# Patient Record
Sex: FEMALE | Race: OTHER | Employment: OTHER | URBAN - METROPOLITAN AREA
[De-identification: names, ages, dates, MRNs, and addresses within clinical notes are randomized per-mention and may not be internally consistent; named-entity substitution may affect disease eponyms.]

---

## 2022-06-20 PROCEDURE — 99285 EMERGENCY DEPT VISIT HI MDM: CPT

## 2022-06-21 ENCOUNTER — HOSPITAL ENCOUNTER (EMERGENCY)
Age: 87
Discharge: HOME | End: 2022-06-21
Attending: STUDENT IN AN ORGANIZED HEALTH CARE EDUCATION/TRAINING PROGRAM | Admitting: STUDENT IN AN ORGANIZED HEALTH CARE EDUCATION/TRAINING PROGRAM

## 2022-06-21 ENCOUNTER — APPOINTMENT (OUTPATIENT)
Dept: GENERAL RADIOLOGY | Age: 87
End: 2022-06-21

## 2022-06-21 VITALS
SYSTOLIC BLOOD PRESSURE: 158 MMHG | OXYGEN SATURATION: 93 % | DIASTOLIC BLOOD PRESSURE: 78 MMHG | RESPIRATION RATE: 17 BRPM | HEART RATE: 85 BPM | HEIGHT: 62 IN | WEIGHT: 165 LBS | TEMPERATURE: 98.7 F | BODY MASS INDEX: 30.36 KG/M2

## 2022-06-21 DIAGNOSIS — J06.9 URI WITH COUGH AND CONGESTION: Primary | ICD-10-CM

## 2022-06-21 LAB
ALBUMIN SERPL-MCNC: 2.5 G/DL (ref 3.2–4.6)
ALBUMIN/GLOB SERPL: 0.7 {RATIO} (ref 1.2–3.5)
ALP SERPL-CCNC: 95 U/L (ref 50–136)
ALT SERPL-CCNC: 26 U/L (ref 12–65)
ANION GAP SERPL CALC-SCNC: 7 MMOL/L (ref 7–16)
AST SERPL-CCNC: 34 U/L (ref 15–37)
BILIRUB SERPL-MCNC: 0.6 MG/DL (ref 0.2–1.1)
BUN SERPL-MCNC: 29 MG/DL (ref 8–23)
CALCIUM SERPL-MCNC: 8.4 MG/DL (ref 8.3–10.4)
CHLORIDE SERPL-SCNC: 114 MMOL/L (ref 98–107)
CO2 SERPL-SCNC: 22 MMOL/L (ref 21–32)
CREAT SERPL-MCNC: 2 MG/DL (ref 0.6–1)
EKG ATRIAL RATE: 77 BPM
EKG DIAGNOSIS: NORMAL
EKG P AXIS: 66 DEGREES
EKG P-R INTERVAL: 147 MS
EKG Q-T INTERVAL: 391 MS
EKG QRS DURATION: 91 MS
EKG QTC CALCULATION (BAZETT): 443 MS
EKG R AXIS: -2 DEGREES
EKG T AXIS: 72 DEGREES
EKG VENTRICULAR RATE: 77 BPM
ERYTHROCYTE [DISTWIDTH] IN BLOOD BY AUTOMATED COUNT: 13.2 % (ref 11.9–14.6)
GLOBULIN SER CALC-MCNC: 3.5 G/DL (ref 2.3–3.5)
GLUCOSE SERPL-MCNC: 110 MG/DL (ref 65–100)
HCT VFR BLD AUTO: 34.7 % (ref 35.8–46.3)
HGB BLD-MCNC: 10.8 G/DL (ref 11.7–15.4)
LIPASE SERPL-CCNC: 142 U/L (ref 73–393)
MAGNESIUM SERPL-MCNC: 3 MG/DL (ref 1.8–2.4)
MCH RBC QN AUTO: 29.8 PG (ref 26.1–32.9)
MCHC RBC AUTO-ENTMCNC: 31.1 G/DL (ref 31.4–35)
MCV RBC AUTO: 95.9 FL (ref 79.6–97.8)
NRBC # BLD: 0 K/UL (ref 0–0.2)
NT PRO BNP: 1402 PG/ML
PLATELET # BLD AUTO: 235 K/UL (ref 150–450)
PMV BLD AUTO: 10.1 FL (ref 9.4–12.3)
POTASSIUM SERPL-SCNC: 4.5 MMOL/L (ref 3.5–5.1)
PROT SERPL-MCNC: 6 G/DL (ref 6.3–8.2)
RBC # BLD AUTO: 3.62 M/UL (ref 4.05–5.2)
SARS-COV-2 RDRP RESP QL NAA+PROBE: NOT DETECTED
SODIUM SERPL-SCNC: 143 MMOL/L (ref 136–145)
SOURCE: NORMAL
TROPONIN I SERPL HS-MCNC: 10.9 PG/ML (ref 0–14)
WBC # BLD AUTO: 11.8 K/UL (ref 4.3–11.1)

## 2022-06-21 PROCEDURE — 87635 SARS-COV-2 COVID-19 AMP PRB: CPT

## 2022-06-21 PROCEDURE — 94664 DEMO&/EVAL PT USE INHALER: CPT

## 2022-06-21 PROCEDURE — 93005 ELECTROCARDIOGRAM TRACING: CPT

## 2022-06-21 PROCEDURE — 80053 COMPREHEN METABOLIC PANEL: CPT

## 2022-06-21 PROCEDURE — 6370000000 HC RX 637 (ALT 250 FOR IP): Performed by: STUDENT IN AN ORGANIZED HEALTH CARE EDUCATION/TRAINING PROGRAM

## 2022-06-21 PROCEDURE — 83880 ASSAY OF NATRIURETIC PEPTIDE: CPT

## 2022-06-21 PROCEDURE — 71046 X-RAY EXAM CHEST 2 VIEWS: CPT

## 2022-06-21 PROCEDURE — 83690 ASSAY OF LIPASE: CPT

## 2022-06-21 PROCEDURE — 2580000003 HC RX 258: Performed by: STUDENT IN AN ORGANIZED HEALTH CARE EDUCATION/TRAINING PROGRAM

## 2022-06-21 PROCEDURE — 85027 COMPLETE CBC AUTOMATED: CPT

## 2022-06-21 PROCEDURE — 84484 ASSAY OF TROPONIN QUANT: CPT

## 2022-06-21 PROCEDURE — 83735 ASSAY OF MAGNESIUM: CPT

## 2022-06-21 PROCEDURE — 2700000000 HC OXYGEN THERAPY PER DAY

## 2022-06-21 PROCEDURE — 71045 X-RAY EXAM CHEST 1 VIEW: CPT

## 2022-06-21 PROCEDURE — 94640 AIRWAY INHALATION TREATMENT: CPT

## 2022-06-21 RX ORDER — SODIUM CHLORIDE 0.9 % (FLUSH) 0.9 %
3 SYRINGE (ML) INJECTION EVERY 8 HOURS
Status: DISCONTINUED | OUTPATIENT
Start: 2022-06-21 | End: 2022-06-21 | Stop reason: HOSPADM

## 2022-06-21 RX ORDER — IPRATROPIUM BROMIDE AND ALBUTEROL SULFATE 2.5; .5 MG/3ML; MG/3ML
1 SOLUTION RESPIRATORY (INHALATION)
Status: COMPLETED | OUTPATIENT
Start: 2022-06-21 | End: 2022-06-21

## 2022-06-21 RX ORDER — AZITHROMYCIN 250 MG/1
250 TABLET, FILM COATED ORAL SEE ADMIN INSTRUCTIONS
Qty: 6 TABLET | Refills: 0 | Status: SHIPPED | OUTPATIENT
Start: 2022-06-21 | End: 2022-06-26

## 2022-06-21 RX ORDER — 0.9 % SODIUM CHLORIDE 0.9 %
250 INTRAVENOUS SOLUTION INTRAVENOUS
Status: COMPLETED | OUTPATIENT
Start: 2022-06-21 | End: 2022-06-21

## 2022-06-21 RX ORDER — 0.9 % SODIUM CHLORIDE 0.9 %
500 INTRAVENOUS SOLUTION INTRAVENOUS
Status: DISCONTINUED | OUTPATIENT
Start: 2022-06-21 | End: 2022-06-21

## 2022-06-21 RX ADMIN — IPRATROPIUM BROMIDE AND ALBUTEROL SULFATE 1 AMPULE: .5; 3 SOLUTION RESPIRATORY (INHALATION) at 01:14

## 2022-06-21 RX ADMIN — SODIUM CHLORIDE 250 ML: 9 INJECTION, SOLUTION INTRAVENOUS at 02:06

## 2022-06-21 ASSESSMENT — PAIN - FUNCTIONAL ASSESSMENT: PAIN_FUNCTIONAL_ASSESSMENT: 0-10

## 2022-06-21 ASSESSMENT — PAIN SCALES - GENERAL
PAINLEVEL_OUTOF10: 3
PAINLEVEL_OUTOF10: 9

## 2022-06-21 ASSESSMENT — PAIN DESCRIPTION - LOCATION
LOCATION: RIB CAGE
LOCATION: CHEST

## 2022-06-21 ASSESSMENT — ENCOUNTER SYMPTOMS
SORE THROAT: 0
DIARRHEA: 0
NAUSEA: 0
SHORTNESS OF BREATH: 1
COUGH: 1
VOMITING: 0
PHOTOPHOBIA: 0

## 2022-06-21 ASSESSMENT — PULMONARY FUNCTION TESTS: PEFR_L/MIN: 28

## 2022-06-21 NOTE — ED PROVIDER NOTES
[facility] Provider Note                   PCP:                None Provider               Age: 82 y.o. Sex: female       ICD-10-CM    1. URI with cough and congestion  J06.9 azithromycin (ZITHROMAX) 250 MG tablet       DISPOSITION Decision To Discharge 06/21/2022 02:43:01 AM       New Prescriptions    AZITHROMYCIN (ZITHROMAX) 250 MG TABLET    Take 1 tablet by mouth See Admin Instructions for 5 days 500mg on day 1 followed by 250mg on days 2 - 5       Orders Placed This Encounter   Procedures    COVID-19, Rapid    XR CHEST PORTABLE    XR CHEST (2 VW)    Troponin    CBC    Comprehensive Metabolic Panel    Lipase    Magnesium    proBNP, N-TERMINAL    Cardiac Monitor    Pulse Oximetry    EKG 12 Lead    Saline lock IV        Rafael Theodore, DO 2:54 AM      MDM  Number of Diagnoses or Management Options  URI with cough and congestion  Diagnosis management comments: 47-year-old female presents the ER with cough and shortness of breath. Increase respiratory rate with increased work of breathing, slightly anxious on exam, will obtain a broad-based work-up. Patient given breathing treatment which seems to improve her work of breathing. Lab work shows normal white count, stable H&H, normal electrolytes, BUN 29, creatinine is 2, GFR 25, normal LFTs and lipase, magnesium is 3.0, patient does appear clinically dry, will give 250 cc of IV fluid, troponin normal.  Chest x-ray questions right lower lobe possible infiltrate as well as right peritracheal region. Radiologist felt this could be early developing infiltrate. BNP 1400, normal troponin. Patient with significantly improved symptoms after breathing treatment here in the ER. Given patient's congestion with continued cough, will place on azithromycin. Advised to follow-up with free clinic. Patient is from [facility] has been telemetry for approximately 1 month with plans to be of another 2 months.   For these reasons, I advised family to have patient be seen by a local family physician. Unclear patient's baseline lab values are. Patient and family voiced understanding and agreement with this plan. Amount and/or Complexity of Data Reviewed  Clinical lab tests: reviewed and ordered  Tests in the radiology section of CPT®: ordered and reviewed  Obtain history from someone other than the patient: yes  Independent visualization of images, tracings, or specimens: yes    Risk of Complications, Morbidity, and/or Mortality  Presenting problems: moderate  Diagnostic procedures: moderate  Management options: moderate         Ivonne Mathews is a 82 y.o. female who presents to the Emergency Department with chief complaint of    Chief Complaint   Patient presents with    Shortness of Breath    Cough      Patient is Croatian-speaking, history obtained via iPad   Patient is 62-year-old female presents emergency department with family at bedside. Family is primary historian. Family reports patient is complained of shortness of breath, dry cough as well as chest pressure that began 4 days ago. Worse today. Patient describes her pain as a pressure, mild to moderate in nature, does not radiate, no other associated symptoms. No worsening or improving factors. Denies abdominal pain. Has a history of hypertension only. Review of Systems   Constitutional: Negative for chills and fever. HENT: Negative for sore throat. Eyes: Negative for photophobia. Respiratory: Positive for cough and shortness of breath. Cardiovascular: Positive for chest pain. Gastrointestinal: Negative for diarrhea, nausea and vomiting. Genitourinary: Negative for dysuria. Musculoskeletal: Negative for neck pain and neck stiffness. Skin: Negative for rash. Neurological: Negative for syncope and headaches. Psychiatric/Behavioral: Negative for confusion. All other systems reviewed and are negative.       No past medical history on file.     No past surgical history on file. No family history on file. Social Connections:     Frequency of Communication with Friends and Family: Not on file    Frequency of Social Gatherings with Friends and Family: Not on file    Attends Baptism Services: Not on file    Active Member of Clubs or Organizations: Not on file    Attends Club or Organization Meetings: Not on file    Marital Status: Not on file        No Known Allergies     Vitals signs and nursing note reviewed. Patient Vitals for the past 4 hrs:   Temp Pulse Resp BP SpO2   06/21/22 0219 -- 85 19 -- 98 %   06/21/22 0216 -- 85 20 -- 96 %   06/21/22 0215 -- 93 23 (!) 151/81 98 %   06/21/22 0144 -- 85 18 -- 96 %   06/21/22 0134 -- 82 21 -- 96 %   06/21/22 0130 -- 78 21 (!) 178/74 --   06/21/22 0115 -- 74 27 -- 97 %   06/21/22 0111 -- 82 (!) 31 -- 94 %   06/21/22 0044 -- 77 17 -- 92 %   06/21/22 0039 -- 83 24 -- 94 %   06/21/22 0034 -- 74 23 (!) 195/93 94 %   06/21/22 0019 -- 80 22 (!) 194/93 94 %   06/21/22 0009 100 °F (37.8 °C) 84 20 (!) 178/95 96 %          Physical Exam  Vitals and nursing note reviewed. Constitutional:       General: She is not in acute distress. Appearance: Normal appearance. HENT:      Head: Normocephalic. Nose: Nose normal.      Mouth/Throat:      Mouth: Mucous membranes are moist.   Eyes:      Extraocular Movements: Extraocular movements intact. Cardiovascular:      Rate and Rhythm: Normal rate and regular rhythm. Pulses: Normal pulses. Heart sounds: Normal heart sounds. Pulmonary:      Effort: Pulmonary effort is normal. Tachypnea present. Breath sounds: Normal breath sounds. Abdominal:      General: Abdomen is flat. Palpations: Abdomen is soft. Tenderness: There is no abdominal tenderness. Musculoskeletal:         General: No swelling or tenderness. Normal range of motion. Cervical back: Normal range of motion. No rigidity.    Skin:     General: Skin is warm.      Findings: No rash. Neurological:      General: No focal deficit present. Mental Status: She is alert and oriented to person, place, and time. Psychiatric:         Mood and Affect: Mood is anxious. Procedures      Labs Reviewed   CBC - Abnormal; Notable for the following components:       Result Value    WBC 11.8 (*)     RBC 3.62 (*)     Hemoglobin 10.8 (*)     Hematocrit 34.7 (*)     MCHC 31.1 (*)     All other components within normal limits   COMPREHENSIVE METABOLIC PANEL - Abnormal; Notable for the following components:    Chloride 114 (*)     Glucose 110 (*)     BUN 29 (*)     CREATININE 2.00 (*)     GFR  31 (*)     GFR Non- 25 (*)     Total Protein 6.0 (*)     Albumin 2.5 (*)     Albumin/Globulin Ratio 0.7 (*)     All other components within normal limits   MAGNESIUM - Abnormal; Notable for the following components:    Magnesium 3.0 (*)     All other components within normal limits   PROBNP, N-TERMINAL - Abnormal; Notable for the following components:    NT Pro-BNP 1,402 (*)     All other components within normal limits   COVID-19, RAPID   TROPONIN   LIPASE   TROPONIN        XR CHEST PORTABLE   Final Result   Subtle increased opacity in the right lower lobe may represent developing   infiltrate. Subtle increased opacity in the right paratracheal region could also be due to   infiltrate or the superior vena cava overlying osseous structures. Follow-up chest x-ray with standard PA and lateral views is recommended when the   patient is able. Mild elevation of the left hemidiaphragm. XR CHEST (2 VW)    (Results Pending)                          Voice dictation software was used during the making of this note. This software is not perfect and grammatical and other typographical errors may be present. This note has not been completely proofread for errors.         Kim Woody DO  06/21/22 [id number]

## 2022-06-21 NOTE — ED TRIAGE NOTES
Having a dry cough for the last 4 days, today started to get worse sob   Family states she is having chest pain yesterday she a little bit and today it is worse   Family states she has hypertension, family and pt are poor historians

## 2022-06-21 NOTE — ED NOTES
I have reviewed discharge instructions with the patient and caregiver. The patient and caregiver verbalized understanding. Patient left ED via Discharge Method: wheelchair to Home with (son and daughter). Opportunity for questions and clarification provided and video  used for d/c teaching      Patient given 1 scripts. To continue your aftercare when you leave the hospital, you may receive an automated call from our care team to check in on how you are doing. This is a free service and part of our promise to provide the best care and service to meet your aftercare needs.  If you have questions, or wish to unsubscribe from this service please call [phone]. Thank you for Choosing our [facility] Emergency Department.       Lucy Liu RN  06/21/22 0053

## 2022-06-22 ENCOUNTER — HOSPITAL ENCOUNTER (EMERGENCY)
Age: 87
Discharge: HOME | End: 2022-06-22
Attending: EMERGENCY MEDICINE

## 2022-06-22 ENCOUNTER — APPOINTMENT (OUTPATIENT)
Dept: GENERAL RADIOLOGY | Age: 87
End: 2022-06-22

## 2022-06-22 VITALS
SYSTOLIC BLOOD PRESSURE: 159 MMHG | BODY MASS INDEX: 30.36 KG/M2 | HEART RATE: 77 BPM | RESPIRATION RATE: 18 BRPM | WEIGHT: 165 LBS | DIASTOLIC BLOOD PRESSURE: 80 MMHG | TEMPERATURE: 98.5 F | HEIGHT: 62 IN | OXYGEN SATURATION: 93 %

## 2022-06-22 DIAGNOSIS — J18.9 PNEUMONIA OF RIGHT LOWER LOBE DUE TO INFECTIOUS ORGANISM: ICD-10-CM

## 2022-06-22 DIAGNOSIS — R06.2 WHEEZING: Primary | ICD-10-CM

## 2022-06-22 LAB
ANION GAP SERPL CALC-SCNC: 7 MMOL/L (ref 7–16)
BASOPHILS # BLD: 0.1 K/UL (ref 0–0.2)
BASOPHILS NFR BLD: 1 % (ref 0–2)
BUN SERPL-MCNC: 28 MG/DL (ref 8–23)
CALCIUM SERPL-MCNC: 8 MG/DL (ref 8.3–10.4)
CHLORIDE SERPL-SCNC: 111 MMOL/L (ref 98–107)
CO2 SERPL-SCNC: 23 MMOL/L (ref 21–32)
CREAT SERPL-MCNC: 2.1 MG/DL (ref 0.6–1)
DIFFERENTIAL METHOD BLD: ABNORMAL
EOSINOPHIL # BLD: 0.1 K/UL (ref 0–0.8)
EOSINOPHIL NFR BLD: 1 % (ref 0.5–7.8)
ERYTHROCYTE [DISTWIDTH] IN BLOOD BY AUTOMATED COUNT: 13 % (ref 11.9–14.6)
GLUCOSE SERPL-MCNC: 121 MG/DL (ref 65–100)
HCT VFR BLD AUTO: 34.4 % (ref 35.8–46.3)
HGB BLD-MCNC: 10.7 G/DL (ref 11.7–15.4)
IMM GRANULOCYTES # BLD AUTO: 0 K/UL (ref 0–0.5)
IMM GRANULOCYTES NFR BLD AUTO: 0 % (ref 0–5)
LYMPHOCYTES # BLD: 4.4 K/UL (ref 0.5–4.6)
LYMPHOCYTES NFR BLD: 33 % (ref 13–44)
MCH RBC QN AUTO: 30.1 PG (ref 26.1–32.9)
MCHC RBC AUTO-ENTMCNC: 31.1 G/DL (ref 31.4–35)
MCV RBC AUTO: 96.9 FL (ref 79.6–97.8)
MONOCYTES # BLD: 1.1 K/UL (ref 0.1–1.3)
MONOCYTES NFR BLD: 8 % (ref 4–12)
NEUTS SEG # BLD: 7.6 K/UL (ref 1.7–8.2)
NEUTS SEG NFR BLD: 57 % (ref 43–78)
NRBC # BLD: 0 K/UL (ref 0–0.2)
PLATELET # BLD AUTO: 232 K/UL (ref 150–450)
PLATELET COMMENT: ADEQUATE
PMV BLD AUTO: 10.5 FL (ref 9.4–12.3)
POTASSIUM SERPL-SCNC: 4.3 MMOL/L (ref 3.5–5.1)
RBC # BLD AUTO: 3.55 M/UL (ref 4.05–5.2)
RBC MORPH BLD: ABNORMAL
SODIUM SERPL-SCNC: 141 MMOL/L (ref 136–145)
WBC # BLD AUTO: 13.3 K/UL (ref 4.3–11.1)
WBC MORPH BLD: ABNORMAL

## 2022-06-22 PROCEDURE — 6370000000 HC RX 637 (ALT 250 FOR IP): Performed by: PHYSICIAN ASSISTANT

## 2022-06-22 PROCEDURE — 94640 AIRWAY INHALATION TREATMENT: CPT

## 2022-06-22 PROCEDURE — 85025 COMPLETE CBC W/AUTO DIFF WBC: CPT

## 2022-06-22 PROCEDURE — 71045 X-RAY EXAM CHEST 1 VIEW: CPT

## 2022-06-22 PROCEDURE — 2700000000 HC OXYGEN THERAPY PER DAY

## 2022-06-22 PROCEDURE — 99284 EMERGENCY DEPT VISIT MOD MDM: CPT

## 2022-06-22 PROCEDURE — 80048 BASIC METABOLIC PNL TOTAL CA: CPT

## 2022-06-22 RX ORDER — IPRATROPIUM BROMIDE AND ALBUTEROL SULFATE 2.5; .5 MG/3ML; MG/3ML
1 SOLUTION RESPIRATORY (INHALATION) EVERY 6 HOURS PRN
Qty: 360 ML | Refills: 0 | Status: SHIPPED | OUTPATIENT
Start: 2022-06-22 | End: 2022-07-22

## 2022-06-22 RX ORDER — IPRATROPIUM BROMIDE AND ALBUTEROL SULFATE 2.5; .5 MG/3ML; MG/3ML
1 SOLUTION RESPIRATORY (INHALATION)
Status: COMPLETED | OUTPATIENT
Start: 2022-06-22 | End: 2022-06-22

## 2022-06-22 RX ORDER — ALBUTEROL SULFATE 90 UG/1
2 AEROSOL, METERED RESPIRATORY (INHALATION) EVERY 4 HOURS PRN
Qty: 18 G | Refills: 1 | Status: SHIPPED | OUTPATIENT
Start: 2022-06-22

## 2022-06-22 RX ADMIN — IPRATROPIUM BROMIDE AND ALBUTEROL SULFATE 1 AMPULE: .5; 3 SOLUTION RESPIRATORY (INHALATION) at 10:00

## 2022-06-22 ASSESSMENT — ENCOUNTER SYMPTOMS
VOMITING: 0
CHEST TIGHTNESS: 0
SHORTNESS OF BREATH: 1
WHEEZING: 1
COLOR CHANGE: 0
ABDOMINAL PAIN: 0

## 2022-06-22 ASSESSMENT — PAIN - FUNCTIONAL ASSESSMENT: PAIN_FUNCTIONAL_ASSESSMENT: NONE - DENIES PAIN

## 2022-06-22 NOTE — ED NOTES
I have reviewed discharge instructions with the caregiver. The caregiver verbalized understanding. Patient left ED via Discharge Method: wheelchair to Home with family. Opportunity for questions and clarification provided. Patient given 2 scripts. To continue your aftercare when you leave the hospital, you may receive an automated call from our care team to check in on how you are doing. This is a free service and part of our promise to provide the best care and service to meet your aftercare needs.  If you have questions, or wish to unsubscribe from this service please call [phone]. Thank you for Choosing our [facility] Emergency Department.            Ladan Murrieta RN  06/22/22 9040

## 2022-06-22 NOTE — ED PROVIDER NOTES
[facility] Emergency Department Provider Note                   PCP:                None Provider               Age: 82 y.o. Sex: female       ICD-10-CM    1. Wheezing  R06.2    2. Pneumonia of right lower lobe due to infectious organism  J18.9        DISPOSITION Decision To Discharge 06/22/2022 12:30:53 PM       New Prescriptions    ALBUTEROL SULFATE HFA (PROVENTIL HFA) 108 (90 BASE) MCG/ACT INHALER    Inhale 2 puffs into the lungs every 4 hours as needed for Wheezing or Shortness of Breath    IPRATROPIUM-ALBUTEROL (DUONEB) 0.5-2.5 (3) MG/3ML SOLN NEBULIZER SOLUTION    Inhale 3 mLs into the lungs every 6 hours as needed for Shortness of Breath (wheezing)       Orders Placed This Encounter   Procedures    XR CHEST PORTABLE    CBC with Auto Differential    Basic Metabolic Panel    DME Order for Pulse Oximeter Device As OP        MIRIAM Song 20:52 PM      MDM  Number of Diagnoses or Management Options  Pneumonia of right lower lobe due to infectious organism: new, needed workup  Wheezing: new, needed workup  Diagnosis management comments: Patient is an 82-year-old female here visiting from [facility] requesting to be seen and prescribed medication for her wheezing. She has done well with DuoNeb treatments and has a nebulizer at home but does not have any more of the medication. She does not have an albuterol inhaler at home. She was seen here in the ER early yesterday morning and was given a DuoNeb treatment with significant relief and was discharged home with azithromycin. She has not had any worsening complaints but is still having wheezing. Work-up yesterday was obtained including negative flu and COVID swabs. Today, patient's WBC is mildly increased when compared to yesterday at 13.3. H&H stable compared to yesterday, kidney function stable, BUN 28, creatinine 2.1. Electrolytes are stable. CXR repeated today showed:   1.  Developing mild infiltrative changes at right lung base superimposed on  chronic lung disease.     Patient improved after DuoNeb treatment here. Oxygen saturation stable on room air, discussed the case with the attending physician. We will continue azithromycin and I will prescribe DuoNeb and albuterol inhaler for home use and advised close follow-up with the primary doctor. I also provided the patient with a home pulse oximeter with strict return precautions given in case O2 sat drops and stays below 90%. Patient and the family members are amenable to this plan and agreed to discharge instructions and all questions were answered. Karl Desai is a 82 y.o. female who presents to the Emergency Department with chief complaint of    Chief Complaint   Patient presents with    Medication Refill    Shortness of Breath      This patient is an 79-year-old  female accompanied by her son and daughter-in-law who presents to the ER with concern for wheezing and shortness of breath. She was evaluated here early yesterday morning and was provided with a DuoNeb breathing treatment which significantly helped her symptoms. She was discharged home with an antibiotic and was told to follow-up with the free clinic. She is here today basically requesting a prescription for DuoNeb as she has used it in the past and it helped her significantly. Her son says that she has been visiting from [facility] and her symptoms started with a cough about 4 days prior to coming in. He reports that the patient basically only whispers and this is her baseline, this has been going on for several years. He says that she has no known heart conditions. States she has been wheezing today but denies any chest pain or sick exposures. Also denies any fevers or chills. Had a negative influenza and COVID test yesterday in the ER. All other systems reviewed and are negative. Review of Systems   Constitutional: Negative for activity change and fever.    Respiratory: Positive for shortness of breath and wheezing. Negative for chest tightness. Cardiovascular: Negative for chest pain, palpitations and leg swelling. Gastrointestinal: Negative for abdominal pain and vomiting. Musculoskeletal: Negative for arthralgias and neck pain. Skin: Negative for color change. All other systems reviewed and are negative. No past medical history on file. No past surgical history on file. No family history on file. Social Connections:     Frequency of Communication with Friends and Family: Not on file    Frequency of Social Gatherings with Friends and Family: Not on file    Attends Evangelical Services: Not on file    Active Member of Clubs or Organizations: Not on file    Attends Club or Organization Meetings: Not on file    Marital Status: Not on file        No Known Allergies     Vitals signs and nursing note reviewed. Patient Vitals for the past 4 hrs:   Temp Pulse Resp BP SpO2   06/22/22 1215 -- -- -- (!) 152/82 92 %   06/22/22 1200 -- -- -- (!) 146/74 96 %   06/22/22 1145 -- -- -- (!) 141/83 95 %   06/22/22 1130 -- -- -- (!) 142/76 95 %   06/22/22 1115 -- -- -- 137/79 94 %   06/22/22 1100 -- -- -- (!) 150/88 94 %   06/22/22 1000 -- -- -- (!) 144/76 90 %   06/22/22 0945 -- -- -- (!) 176/79 94 %   06/22/22 0940 98.5 °F (36.9 °C) 77 18 136/84 97 %          Physical Exam  Vitals and nursing note reviewed. Constitutional:       General: She is not in acute distress. Appearance: Normal appearance. She is not ill-appearing. HENT:      Head: Normocephalic and atraumatic. Eyes:      Conjunctiva/sclera: Conjunctivae normal.   Cardiovascular:      Rate and Rhythm: Normal rate and regular rhythm. Pulses: Normal pulses. Pulmonary:      Effort: Pulmonary effort is normal. No tachypnea, accessory muscle usage or respiratory distress. Breath sounds: No stridor. Examination of the right-lower field reveals decreased breath sounds.  Decreased breath sounds and wheezing present. No rhonchi or rales. Abdominal:      Palpations: Abdomen is soft. Musculoskeletal:      Cervical back: Normal range of motion and neck supple. Right lower leg: No edema. Left lower leg: No edema. Skin:     General: Skin is warm. Neurological:      General: No focal deficit present. Mental Status: She is alert. Mental status is at baseline. Procedures    ED EKG Interpretation  Labs Reviewed   CBC WITH AUTO DIFFERENTIAL - Abnormal; Notable for the following components:       Result Value    WBC 13.3 (*)     RBC 3.55 (*)     Hemoglobin 10.7 (*)     Hematocrit 34.4 (*)     MCHC 31.1 (*)     All other components within normal limits   BASIC METABOLIC PANEL - Abnormal; Notable for the following components:    Chloride 111 (*)     Glucose 121 (*)     BUN 28 (*)     CREATININE 2.10 (*)     GFR  29 (*)     GFR Non-African American 24 (*)     Calcium 8.0 (*)     All other components within normal limits        XR CHEST PORTABLE   Final Result      1. Developing mild infiltrative changes at right lung base superimposed on   chronic lung disease. CPT code(s) [id number]                                     ED Course as of 06/22/22 1238   Wed Jun 22, 2022   1050 CXR    IMPRESSION:     1. Developing mild infiltrative changes at right lung base superimposed on  chronic lung disease. [AR]   1228 Patient feels better after DuoNeb treatment, was on 1 L of oxygen, I took the patient off oxygen and O2 sat remained between 95 and 97% on room air. No accessory muscles use, improvement in breath sounds without further wheezing. Will discharge home and have patient follow-up closely with the PCP. Return precautions discussed. We will also provide patient with DME pulse oximeter for home use. [AR]      ED Course User Index  [AR] MIRIAM Parish        Voice dictation software was used during the making of this note.   This software is not perfect and grammatical and other typographical errors may be present. This note has not been completely proofread for errors.        [place], [address]  66/25/23 0372

## 2022-06-22 NOTE — ED TRIAGE NOTES
Pt to triage with c/o shortness of breath. Pt was seen at 2 days ago and received a nebulizer with relief. Pt has been unable to get her Rx filled.  Pt reports she had been taking in [facility].